# Patient Record
Sex: FEMALE | Race: BLACK OR AFRICAN AMERICAN
[De-identification: names, ages, dates, MRNs, and addresses within clinical notes are randomized per-mention and may not be internally consistent; named-entity substitution may affect disease eponyms.]

---

## 2020-09-03 ENCOUNTER — HOSPITAL ENCOUNTER (EMERGENCY)
Dept: HOSPITAL 1 - ED | Age: 50
Discharge: HOME | End: 2020-09-03
Payer: COMMERCIAL

## 2020-09-03 VITALS — BODY MASS INDEX: 41.44 KG/M2 | WEIGHT: 264 LBS | HEIGHT: 67 IN

## 2020-09-03 VITALS — DIASTOLIC BLOOD PRESSURE: 91 MMHG | SYSTOLIC BLOOD PRESSURE: 143 MMHG
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
## 2020-09-03 DIAGNOSIS — Z91.040: ICD-10-CM

## 2020-09-03 DIAGNOSIS — S40.862A: Primary | ICD-10-CM
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
## 2020-09-03 DIAGNOSIS — L03.114: ICD-10-CM
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
## 2022-08-05 ENCOUNTER — OFFICE (OUTPATIENT)
Dept: URBAN - METROPOLITAN AREA CLINIC 70 | Facility: CLINIC | Age: 52
End: 2022-08-05

## 2022-08-05 VITALS — HEIGHT: 67 IN | WEIGHT: 260 LBS

## 2022-08-05 DIAGNOSIS — Z13.810 ENCOUNTER FOR SCREENING FOR UPPER GASTROINTESTINAL DISORDER: ICD-10-CM

## 2022-08-05 PROCEDURE — 99204 OFFICE O/P NEW MOD 45 MIN: CPT | Performed by: NURSE PRACTITIONER

## 2022-08-05 PROCEDURE — G0406 INPT/TELE FOLLOW UP 15: HCPCS | Performed by: NURSE PRACTITIONER

## 2022-08-05 PROCEDURE — 99205 OFFICE O/P NEW HI 60 MIN: CPT | Performed by: NURSE PRACTITIONER

## 2022-08-05 NOTE — SERVICEHPINOTES
52-year-old female presents for an upper endoscopy consultation prior to undergoing bariatric surgery with Dr. Mamadou Villanueva. She is considering gastric bypass. Denies heartburn, acid regurgitation, belching, or abdominal pain.  
br
brShe is having complete bowel movements daily. Denies constipation, diarrhea, or rectal bleeding. She has not yet had a screening colonoscopy and denies family history of colon cancer.

## 2022-08-05 NOTE — SERVICENOTES
This case was reviewed by the supervising physician who agrees with the plan.
Telehealth visit duration: 9 minutes

## 2022-10-12 ENCOUNTER — AMBULATORY SURGICAL CENTER (OUTPATIENT)
Dept: URBAN - METROPOLITAN AREA SURGERY 5 | Facility: SURGERY | Age: 52
End: 2022-10-12

## 2022-10-12 VITALS
DIASTOLIC BLOOD PRESSURE: 77 MMHG | TEMPERATURE: 96.9 F | SYSTOLIC BLOOD PRESSURE: 142 MMHG | HEIGHT: 64 IN | HEART RATE: 69 BPM | OXYGEN SATURATION: 97 % | RESPIRATION RATE: 20 BRPM | WEIGHT: 150 LBS

## 2022-10-12 LAB — SURGICAL: PDF REPORT: (no result)

## 2022-10-12 PROCEDURE — 43239 EGD BIOPSY SINGLE/MULTIPLE: CPT | Performed by: SPECIALIST
